# Patient Record
Sex: MALE | Race: WHITE | NOT HISPANIC OR LATINO | URBAN - METROPOLITAN AREA
[De-identification: names, ages, dates, MRNs, and addresses within clinical notes are randomized per-mention and may not be internally consistent; named-entity substitution may affect disease eponyms.]

---

## 2017-10-04 ENCOUNTER — ALLSCRIPTS OFFICE VISIT (OUTPATIENT)
Dept: OTHER | Facility: OTHER | Age: 32
End: 2017-10-04

## 2017-10-04 DIAGNOSIS — R73.9 HYPERGLYCEMIA: ICD-10-CM

## 2017-10-04 LAB — HBA1C MFR BLD HPLC: 5.5 %

## 2018-01-12 NOTE — PROGRESS NOTES
Assessment    1  Encounter for preventive health examination (V70 0) (Z00 00)   2  Flu vaccine need (V04 81) (Z23)    Plan  Flu vaccine need    · Fluzone Quadrivalent 0 5 ML Intramuscular Suspension Prefilled Syringe  Hyperglycemia    · (1) GLUCOSE,  FASTING; Status:Active; Requested for:04Oct2017;    · (1) HEMOGLOBIN A1C; Status:Active; Requested for:04Oct2017;    · Hemoglobin A1c- POC; Status:Complete;   Done: [id number] 01:32PM    Discussion/Summary  Impression: health maintenance visit  He is in today for routine physical, fasting glucose screening at his work was 130, A1c run here today at 5 5  He has been using a family member's glucometer, runs 100-110 in the morning  We discussed increased risk for diabetes, he has started exercise at the gym and will continue with same, he will watch his diet closer and workup weight loss, his goal will be 250 lb by the end of the year  I would like him to redo blood work in 6 months, we should see him yearly  He continues to use 1/2 10 snuff daily, he will work at tapering down and quitting, has been unable to quit  Chief Complaint  Physical      History of Present Illness  HPI: was last here 9/15 - in for PE - feels well - but screen at work showed Glucose 130- relates was sick w cold at that time - has been watching diet/ exercising since then - used fam members glucometer- readings 100-110 in AM    Exercises 4-5 x a week since August - has lost few lbs  Re snuff use - same- 1/2 tin daily  Has 22 mo old daughter      Review of Systems    Constitutional: as noted in HPI, no fever, not feeling poorly, no chills and not feeling tired  Eyes: sees eye dr, but no eyesight problems  ENT: no earache and no sore throat  Cardiovascular: No complaints of slow heart rate, no fast heart rate, no chest pain, no palpitations, no leg claudication, no lower extremity     Respiratory: No complaints of shortness of breath, no wheezing, no cough, no SOB on exertion, no orthopnea or PND  Gastrointestinal: no gerd, but No complaints of abdominal pain, no constipation, no nausea or vomiting, no diarrhea or bloody stools  Genitourinary: no dysuria and no testicular pain  Musculoskeletal: No complaints of arthralgia, no myalgias, no joint swelling or stiffness, no limb pain or swelling  Integumentary: no skin lesions and no skin wound  Neurological: no headache, no confusion, no dizziness, no convulsions and no fainting  Psychiatric: no anxiety and no depression  Hematologic/Lymphatic: No complaints of swollen glands, no swollen glands in the neck, does not bleed easily, no easy bruising  Active Problems    1  Obesity (278 00) (E66 9)   2  Tobacco use (305 1) (Z72 0)    Past Medical History    · History of reactive airway disease (V12 69) (Z87 09)   · History of Labral tear of shoulder (840 8) (E77 388T)    Surgical History    · History of Oral Surgery   · History of Treatment Of Wrist Fracture   · History of Wrist Surgery Left    Family History  Mother    · Family history of malignant neoplasm of urinary bladder (V16 52) (Z80 52)  Maternal Grandmother    · Family history of multiple sclerosis (V17 2) (Z82 0)  Paternal Grandfather    · Family history of diabetes mellitus (V18 0) (Z83 3)    Social History    · Full-time employment   · [facility]  [address]      · Inadequate exercise (V69 0) (Z72 3)   ·    · Social alcohol use (Z78 9)   · Tobacco use (305 1) (Z72 0)   · snuff- [place] 1/2 tin a day    Current Meds   1  No Reported Medications Recorded    Allergies    1  No Known Drug Allergies    Vitals   Recorded: [id number] 12:48PM   Heart Rate 76   Systolic 594   Diastolic 84   Height 6 ft 1 in   Weight 268 lb 4 oz   BMI Calculated 35 39   BSA Calculated 2 44     Physical Exam    Constitutional   General appearance: No acute distress, well appearing and well nourished  obese     Head and Face   Head and face: Normal     Eyes   Conjunctiva and lids: No erythema, swelling or discharge  Ears, Nose, Mouth, and Throat   Oropharynx: Normal with no erythema, edema, exudate or lesions  Neck   Neck: Supple, symmetric, trachea midline, no masses  Thyroid: Normal, no thyromegaly  Pulmonary   Auscultation of lungs: Clear to auscultation  Cardiovascular   Auscultation of heart: Normal rate and rhythm, normal S1 and S2, no murmurs  Carotid pulses: 2+ bilaterally  Examination of extremities for edema and/or varicosities: Normal     Abdomen   Abdomen: Non-tender, no masses  Lymphatic   Palpation of lymph nodes in neck: No lymphadenopathy  Musculoskeletal   Gait and station: Normal     Range of motion: Normal     Stability: Normal     Muscle strength/tone: Normal     Skin   Skin and subcutaneous tissue: Normal without rashes or lesions  Neurologic   Cortical function: Normal mental status  Coordination: Normal finger to nose and heel to shin  Psychiatric   Judgment and insight: Normal     Orientation to person, place and time: Normal     Recent and remote memory: Intact  Mood and affect: Normal        Results/Data  PHQ-2 Adult Depression Screening [id number] 12:56PM User, s     Test Name Result Flag Reference   PHQ-2 Adult Depression Score 0     Over the last two weeks, how often have you been bothered by any of the following problems?   Little interest or pleasure in doing things: Not at all - 0  Feeling down, depressed, or hopeless: Not at all - 0   PHQ-2 Adult Depression Screening Negative         Future Appointments    Date/Time Provider Specialty Site   10/05/2018 01:00 PM Rebel Bueno DO [facility] [address] [facility]     Signatures   Electronically signed by : Marlis Nissen, DO; Oct  4 2017  1:56PM EST                       (Author)

## 2018-01-14 VITALS
HEIGHT: 73 IN | SYSTOLIC BLOOD PRESSURE: 138 MMHG | HEART RATE: 76 BPM | DIASTOLIC BLOOD PRESSURE: 84 MMHG | WEIGHT: 268.25 LBS | BODY MASS INDEX: 35.55 KG/M2

## 2018-03-07 NOTE — PROGRESS NOTES
History of Present Illness    Revaccination   Vaccine Information: Vaccine(s) Given (names): adacel 4/18/2016  Spoke with patient regarding  Action(s): Pt will be revaccinated  Appointment scheduled: [id number] pp  Revaccination Completed: [id number]  Active Problems    1  Hypercholesteremia (272 0) (E78 00)   2  Need for revaccination (V05 9) (Z23)   3  Need for Tdap vaccination (V06 1) (Z23)   4  Obesity (278 00) (E66 9)   5  Tobacco use (305 1) (Z72 0)    Immunizations  Tdap --- Ankit Shaffer: 18-Apr-2016     Current Meds   1  No Reported Medications    Allergies    1  No Known Drug Allergies    Plan    1   RVAC-Adacel 5-2-15 5 LF-MCG/0 5 Intramuscular Suspension    Education  Education Items with no Session   RVAC-Adacel 5-2-15 5 LF-MCG/0 5 Intramuscular Suspension;  Provided: [id number]  01:37PM; Counselor: Carlos Lugo;     Signatures   Electronically signed by : Carrington Barboza DO; [place]  3 2017  3:45PM EST                       (Author)